# Patient Record
Sex: FEMALE | Race: WHITE | NOT HISPANIC OR LATINO | URBAN - METROPOLITAN AREA
[De-identification: names, ages, dates, MRNs, and addresses within clinical notes are randomized per-mention and may not be internally consistent; named-entity substitution may affect disease eponyms.]

---

## 2021-06-08 ENCOUNTER — OFFICE VISIT (OUTPATIENT)
Dept: URBAN - METROPOLITAN AREA TELEHEALTH 2 | Facility: TELEHEALTH | Age: 51
End: 2021-06-08

## 2022-07-09 ENCOUNTER — TELEPHONE ENCOUNTER (OUTPATIENT)
Dept: URBAN - METROPOLITAN AREA CLINIC 121 | Facility: CLINIC | Age: 52
End: 2022-07-09

## 2022-07-09 RX ORDER — MESALAMINE 1.2 G/1
TAKE 2 TABLETS PO ONCE A DAY TABLET, DELAYED RELEASE ORAL
Refills: 5 | OUTPATIENT
Start: 2016-02-15 | End: 2016-08-16

## 2022-07-09 RX ORDER — ZOLPIDEM TARTRATE 10 MG
TABLET ORAL
Refills: 0 | OUTPATIENT
Start: 2015-07-21 | End: 2015-09-28

## 2022-07-09 RX ORDER — ZOLPIDEM TARTRATE 10 MG
TABLET ORAL
Refills: 0 | OUTPATIENT
Start: 2015-11-12 | End: 2016-02-15

## 2022-07-09 RX ORDER — MESALAMINE 1000 MG/1
UNWRAP AND INSERT ONE SUPPOSITORY ONCE A DAY AT BEDTIME SUPPOSITORY RECTAL ONCE A DAY
Refills: 0 | OUTPATIENT
Start: 2015-09-28 | End: 2015-11-12

## 2022-07-09 RX ORDER — MESALAMINE 1.2 G/1
TAKE 2 TABS PO ONCE DAILY TABLET, DELAYED RELEASE ORAL
Refills: 0 | OUTPATIENT
Start: 2015-11-12 | End: 2016-03-16

## 2022-07-09 RX ORDER — ASPIRIN 325 MG/1
ONCE A WEEK TABLET ORAL TAKE AS DIRECTED
Refills: 0 | OUTPATIENT
Start: 2015-09-28 | End: 2015-09-28

## 2022-07-09 RX ORDER — POLYETHYLENE GLYCOL-3350 AND ELECTROLYTES WITH FLAVOR PACK 240; 5.84; 2.98; 6.72; 22.72 G/278.26G; G/278.26G; G/278.26G; G/278.26G; G/278.26G
TAKE AS DIRECTED PRIOR TO COLONOSCOPY POWDER, FOR SOLUTION ORAL TAKE AS DIRECTED
Refills: 0 | OUTPATIENT
Start: 2015-07-21 | End: 2015-09-28

## 2022-07-09 RX ORDER — ZOLPIDEM TARTRATE 10 MG
TABLET ORAL
Refills: 2 | OUTPATIENT
Start: 2016-02-15 | End: 2016-03-16

## 2022-07-09 RX ORDER — MESALAMINE 1000 MG/1
UNWRAP AND INSERT ONE SUPPOSITORY ONCE A DAY AT BEDTIME FOR 30 DAYS SUPPOSITORY RECTAL ONCE A DAY
Refills: 0 | OUTPATIENT
Start: 2015-11-12 | End: 2016-03-16

## 2022-07-09 RX ORDER — ZOLPIDEM TARTRATE 10 MG
TABLET ORAL
Refills: 0 | OUTPATIENT
Start: 2015-09-28 | End: 2015-11-12

## 2022-07-09 RX ORDER — ASPIRIN 325 MG/1
ONCE A WEEK TABLET ORAL TAKE AS DIRECTED
Refills: 0 | OUTPATIENT
Start: 2015-07-21 | End: 2015-09-28

## 2022-07-10 ENCOUNTER — TELEPHONE ENCOUNTER (OUTPATIENT)
Dept: URBAN - METROPOLITAN AREA CLINIC 121 | Facility: CLINIC | Age: 52
End: 2022-07-10

## 2022-07-10 RX ORDER — LINACLOTIDE 145 UG/1
TAKE ONE CAPSULE PO ONCE A DAY CAPSULE, GELATIN COATED ORAL ONCE A DAY
Refills: 3 | Status: ACTIVE | COMMUNITY
Start: 2016-03-16

## 2022-07-10 RX ORDER — HYDROCORTISONE ACETATE 25 MG/1
UNWRAP AND INSERT ONE SUPPOSITORY PR TWICE A DAY FOR 7 DAYS SUPPOSITORY RECTAL TWICE A DAY
Refills: 0 | Status: ACTIVE | COMMUNITY
Start: 2015-07-21

## 2022-07-10 RX ORDER — MESALAMINE 1.2 G/1
TAKE 2 TABLETS BY MOUTH EVERY DAY TABLET, DELAYED RELEASE ORAL
Refills: 5 | Status: ACTIVE | COMMUNITY
Start: 2016-08-16

## 2022-07-10 RX ORDER — MESALAMINE 1000 MG/1
UNWRAP AND INSERT ONE SUPPOSITORY ONCE A DAY AT BEDTIME SUPPOSITORY RECTAL ONCE A DAY
Refills: 3 | Status: ACTIVE | COMMUNITY
Start: 2016-02-15

## 2022-07-10 RX ORDER — ZOLPIDEM TARTRATE 10 MG
TABLET ORAL
Refills: 2 | Status: ACTIVE | COMMUNITY
Start: 2016-03-16

## 2024-09-06 ENCOUNTER — DASHBOARD ENCOUNTERS (OUTPATIENT)
Age: 54
End: 2024-09-06

## 2024-09-10 ENCOUNTER — OFFICE VISIT (OUTPATIENT)
Dept: URBAN - METROPOLITAN AREA CLINIC 63 | Facility: CLINIC | Age: 54
End: 2024-09-10

## 2024-09-10 RX ORDER — MIRTAZAPINE 7.5 MG/1
TABLET, FILM COATED ORAL
Qty: 90 TABLET | Status: ACTIVE | COMMUNITY

## 2024-09-10 RX ORDER — CYCLOBENZAPRINE 10 MG/1
TAKE 1 TABLET BY MOUTH 3 TIMES PER DAY AS NEEDED FOR MUSCLE SPASMS X 7 DAYS TABLET, FILM COATED ORAL
Qty: 20 EACH | Refills: 0 | Status: ACTIVE | COMMUNITY

## 2024-09-10 RX ORDER — AMLODIPINE BESYLATE 10 MG/1
TABLET ORAL
Qty: 30 TABLET | Status: ACTIVE | COMMUNITY

## 2024-09-10 RX ORDER — DEXTROAMPHETAMINE SACCHARATE AND AMPHETAMINE ASPARTATE AND DEXTROAMPHETAMINE SULFATE AND AMPHETAMINE SULFATE 2.5; 2.5; 2.5; 2.5 MG/1; MG/1; MG/1; MG/1
TABLET ORAL
Qty: 60 TABLET | Status: ACTIVE | COMMUNITY

## 2024-09-10 RX ORDER — ZOLPIDEM TARTRATE 5 MG/1
TAKE 1 TABLET BY MOUTH AT BEDTIME TABLET, FILM COATED ORAL
Qty: 30 EACH | Refills: 0 | Status: ACTIVE | COMMUNITY

## 2024-09-10 NOTE — HPI-TODAY'S VISIT:
53-year-old female with hypertension and ADHD presents to the office for evaluation of abdominal pain and bloating. She states Colonoscopy 2015 to assess rectal bleedin hyperplastic polyps measuring 9 to 11 mm were removed from the rectum.  There was a diffuse area of moderately congested, erythematous and inflamed mucosa in the rectum and in the rectosigmoid colon.  The colonic mucosa appeared normal otherwise.  The terminal ileum appeared normal.  Biopsy of the terminal ileum showed no significant abnormality.  Biopsies of the ascending colon, transverse colon, descending colon, and sigmoid colon showed no significant abnormality.  Rectosigmoid colon biopsy showed moderately active chronic colitis with cryptitis and crypt abscess.  Rectal biopsy showed moderately active chronic colitis with cryptitis and crypt abscess. She was treated with Canasa suppositories and underwent flexible sigmoidoscopy 10/29/2015 which demonstrated a localized area of mildly erythematous mucosa in the rectum.  Rectal biopsy showed mild acute proctitis with focal crypt abscess formation.  A 7 mm hyperplastic polyp was removed from the rectosigmoid colon.  Small internal hemorrhoids were observed.  Following her flex sig, she was started on Lialda 2.4 g daily and Canasa suppositories were refilled.  Her labs including CBC, CMP, and ESR were normal at the time.  Stool studies were negative. She had resolution of her symptoms and was lost to follow-up until she came back in  reporting new onset of nausea over 2 weeks.  She had no complaints of any abdominal pain, diarrhea, or rectal bleeding.  She was taking fiber supplements for mild constipation.  Colonoscopy was recommended but she declined.  She was advised to avoid NSAIDs and to complete her course of Carafate which was prescribed by her PCP and to follow-up if her nausea persisted.